# Patient Record
Sex: FEMALE | Race: WHITE | NOT HISPANIC OR LATINO
[De-identification: names, ages, dates, MRNs, and addresses within clinical notes are randomized per-mention and may not be internally consistent; named-entity substitution may affect disease eponyms.]

---
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
## 2022-02-24 ENCOUNTER — APPOINTMENT (OUTPATIENT)
Dept: PEDIATRIC ORTHOPEDIC SURGERY | Facility: CLINIC | Age: 22
End: 2022-02-24
Payer: MEDICAID

## 2022-02-24 DIAGNOSIS — M25.561 PAIN IN RIGHT KNEE: ICD-10-CM

## 2022-02-24 PROCEDURE — 99203 OFFICE O/P NEW LOW 30 MIN: CPT | Mod: 25

## 2022-02-24 PROCEDURE — 73564 X-RAY EXAM KNEE 4 OR MORE: CPT

## 2022-02-27 NOTE — REVIEW OF SYSTEMS
[Joint Pains] : arthralgias [Joint Swelling] : joint swelling  [Change in Activity] : no change in activity [Fever Above 102] : no fever [Malaise] : no malaise [Rash] : no rash [Itching] : no itching [Eye Pain] : no eye pain [Heart Problems] : no heart problems [Nasal Stuffiness] : no nasal congestion [Tachypnea] : no tachypnea [Limping] : no limping [Muscle Aches] : no muscle aches

## 2022-02-27 NOTE — END OF VISIT
[FreeTextEntry3] : I, Pantera Barcenas MD, personally saw and evaluated the patient and developed the plan as documented above. I concur or have edited the note as appropriate.\par

## 2022-02-27 NOTE — ASSESSMENT
[FreeTextEntry1] : Dee is a 21-year-old female with right knee pain following an injury on February 19, 2022.\par \par Today's visit included obtaining the history from the child and parent, due to the child's age, the child could not be considered a reliable historian, requiring the parent to act as an independent historian. The condition, natural history, and prognosis were explained to the patient and family. The clinical findings and imaging were reviewed with the family. \par -It was discussed with Dee that based on radiographs and clinical examination there is no orthopedic concern for her right knee pain\par -At this time she can continue with her knee immobilizer for comfort as needed\par -It was discussed that physical therapy to work on knee range of motion and quad strengthening could help decrease her pain, a prescription was provided for that today.\par -If she continues to have pain she should return for follow-up and at that time a possible MRI of the area will be considered.\par -She is to follow-up as needed or if her pain does not improve in the next few weeks for further clinical evaluation\par \par All questions and concerns were addressed today. Parent and patient verbalize understanding and agree with plan of care.\par \par I, Natalie Swan, have acted as a scribe and documented the above information for Dr. Barcenas \par

## 2022-02-27 NOTE — HISTORY OF PRESENT ILLNESS
[FreeTextEntry1] : Dee is an 21-year-old female with right knee pain.  She states that on Saturday, February 19 she stood up from the couch and endorsed immediate pain and popping about the right kneecap.  She states that it was immediately swollen and red but then by the following day the swelling had resolved.  She states that since that date she is been having discomfort about the knee on occasion where she feels as if there is air in her knee and when that air is keeps she has pain.  She presented to urgent care where radiographs were not obtained and it was discussed that they were unsure of what was causing her pain.  They stated that if she was still having pain she was to follow-up with orthopedics for further evaluation.  She presents today for further evaluation of the above, she states that she still having intermittent knee pain.  She states that she has been using a knee brace that she obtained from her sister and that it helps slightly with the discomfort.  She denies numbness or tingling about the lower extremity. She denies fever or chills. She denies any further injury to the area.

## 2022-02-27 NOTE — PHYSICAL EXAM
[FreeTextEntry1] : GENERAL: alert, cooperative, in NAD\par SKIN: The skin is intact, warm, pink and dry over the area examined.\par EYES: Normal conjunctiva, normal eyelids and pupils were equal and round.\par ENT: normal ears, normal nose and normal lips.\par CARDIOVASCULAR: brisk capillary refill, but no peripheral edema.\par RESPIRATORY: The patient is in no apparent respiratory distress. They're taking full deep breaths without use of accessory muscles or evidence of audible wheezes or stridor without the use of a stethoscope. Normal respiratory effort.\par ABDOMEN: not examined. \par \par Right knee\par No bony deformities, signs of trauma, or erythema noted. \par No visible effusion, muscle atrophy or asymmetry. \par There is no sign of genu varum or valgum. \par No tenderness in bony prominences or soft tissue. \par Negative J-sign\par No joint line, MCL, LCL,patellar tendon, or quadriceps tendon tenderness. \par Full active and passive range of motion of the knee.\par No joint laxity palpable. \par Joint is stable with varus and valgus stress. \par Negative Lachmann test, negative anterior and posterior drawer with solid end point. \par Negative Brad test. \par Negative patellar grind and patellar apprehension test. \par No abnormal findings on ankle or hip examination.\par Strength is 5/5. Sensation is intact to light touch distally. Brisk capillary refill in all toes.  Toes are warm, pink, and moving freely. \par No signs of antalgic gait, walks with coordination and balance\par

## 2022-02-27 NOTE — DATA REVIEWED
[de-identified] : right knee radiographs were obtained and independently reviewed during today's visit 02/24/22. There is no sign of fracture, dislocation or bony injury. The patella is stable and sitting well within the trochlear groove.

## 2022-11-22 ENCOUNTER — EMERGENCY (EMERGENCY)
Facility: HOSPITAL | Age: 22
LOS: 1 days | Discharge: HOME | End: 2022-11-22
Attending: STUDENT IN AN ORGANIZED HEALTH CARE EDUCATION/TRAINING PROGRAM | Admitting: STUDENT IN AN ORGANIZED HEALTH CARE EDUCATION/TRAINING PROGRAM
Payer: MEDICAID

## 2022-11-22 VITALS
HEART RATE: 107 BPM | SYSTOLIC BLOOD PRESSURE: 130 MMHG | RESPIRATION RATE: 18 BRPM | HEIGHT: 66 IN | WEIGHT: 194.01 LBS | TEMPERATURE: 98 F | DIASTOLIC BLOOD PRESSURE: 78 MMHG | OXYGEN SATURATION: 99 %

## 2022-11-22 LAB
APPEARANCE UR: ABNORMAL
BACTERIA # UR AUTO: ABNORMAL
BASOPHILS # BLD AUTO: 0.05 K/UL — SIGNIFICANT CHANGE UP (ref 0–0.2)
BASOPHILS NFR BLD AUTO: 0.4 % — SIGNIFICANT CHANGE UP (ref 0–2)
BILIRUB UR-MCNC: NEGATIVE — SIGNIFICANT CHANGE UP
COLOR SPEC: YELLOW — SIGNIFICANT CHANGE UP
DIFF PNL FLD: ABNORMAL
EOSINOPHIL # BLD AUTO: 0.02 K/UL — SIGNIFICANT CHANGE UP (ref 0–0.5)
EOSINOPHIL NFR BLD AUTO: 0.2 % — SIGNIFICANT CHANGE UP (ref 0–6)
EPI CELLS # UR: SIGNIFICANT CHANGE UP
GLUCOSE UR QL: NEGATIVE — SIGNIFICANT CHANGE UP
HCT VFR BLD CALC: 42.5 % — SIGNIFICANT CHANGE UP (ref 34.5–45)
HGB BLD-MCNC: 13.7 G/DL — SIGNIFICANT CHANGE UP (ref 11.5–15.5)
IMM GRANULOCYTES NFR BLD AUTO: 0.3 % — SIGNIFICANT CHANGE UP (ref 0–0.9)
KETONES UR-MCNC: ABNORMAL
LEUKOCYTE ESTERASE UR-ACNC: ABNORMAL
LYMPHOCYTES # BLD AUTO: 2.89 K/UL — SIGNIFICANT CHANGE UP (ref 1–3.3)
LYMPHOCYTES # BLD AUTO: 24.8 % — SIGNIFICANT CHANGE UP (ref 13–44)
MCHC RBC-ENTMCNC: 28 PG — SIGNIFICANT CHANGE UP (ref 27–34)
MCHC RBC-ENTMCNC: 32.2 GM/DL — SIGNIFICANT CHANGE UP (ref 32–36)
MCV RBC AUTO: 86.9 FL — SIGNIFICANT CHANGE UP (ref 80–100)
MONOCYTES # BLD AUTO: 0.83 K/UL — SIGNIFICANT CHANGE UP (ref 0–0.9)
MONOCYTES NFR BLD AUTO: 7.1 % — SIGNIFICANT CHANGE UP (ref 2–14)
NEUTROPHILS # BLD AUTO: 7.81 K/UL — HIGH (ref 1.8–7.4)
NEUTROPHILS NFR BLD AUTO: 67.2 % — SIGNIFICANT CHANGE UP (ref 43–77)
NITRITE UR-MCNC: NEGATIVE — SIGNIFICANT CHANGE UP
NRBC # BLD: 0 /100 WBCS — SIGNIFICANT CHANGE UP (ref 0–0)
OB PNL STL: POSITIVE
PH UR: 6 — SIGNIFICANT CHANGE UP (ref 5–8)
PLATELET # BLD AUTO: 311 K/UL — SIGNIFICANT CHANGE UP (ref 150–400)
PROT UR-MCNC: 15
RBC # BLD: 4.89 M/UL — SIGNIFICANT CHANGE UP (ref 3.8–5.2)
RBC # FLD: 14 % — SIGNIFICANT CHANGE UP (ref 10.3–14.5)
RBC CASTS # UR COMP ASSIST: SIGNIFICANT CHANGE UP /HPF (ref 0–4)
SP GR SPEC: 1.02 — SIGNIFICANT CHANGE UP (ref 1.01–1.02)
UROBILINOGEN FLD QL: NEGATIVE — SIGNIFICANT CHANGE UP
WBC # BLD: 11.63 K/UL — HIGH (ref 3.8–10.5)
WBC # FLD AUTO: 11.63 K/UL — HIGH (ref 3.8–10.5)
WBC UR QL: SIGNIFICANT CHANGE UP

## 2022-11-22 PROCEDURE — 99285 EMERGENCY DEPT VISIT HI MDM: CPT

## 2022-11-22 RX ORDER — PHENTERMINE HCL 30 MG
1 CAPSULE ORAL
Qty: 0 | Refills: 0 | DISCHARGE

## 2022-11-22 RX ORDER — SODIUM CHLORIDE 9 MG/ML
1000 INJECTION INTRAMUSCULAR; INTRAVENOUS; SUBCUTANEOUS ONCE
Refills: 0 | Status: COMPLETED | OUTPATIENT
Start: 2022-11-22 | End: 2022-11-22

## 2022-11-22 RX ORDER — RIZATRIPTAN BENZOATE 5 MG/1
0 TABLET ORAL
Qty: 0 | Refills: 0 | DISCHARGE

## 2022-11-22 RX ADMIN — SODIUM CHLORIDE 1000 MILLILITER(S): 9 INJECTION INTRAMUSCULAR; INTRAVENOUS; SUBCUTANEOUS at 23:03

## 2022-11-22 NOTE — ED PROVIDER NOTE - PROGRESS NOTE DETAILS
Results of work up d/w patient and copies of all reports given.  Patient and mom aware of large right ovarian cyst, patient will follow up.  Tricia rosales, NT/ND.  Will give GI follow up Results of work up d/w patient and copies of all reports given.  Patient and mom aware of large right ovarian cyst, patient will follow up.  Tricia rosales, NT/ND.  Will give GI follow up.  Patient aware that she may need a colonoscopy if symptoms persist.

## 2022-11-22 NOTE — ED PROVIDER NOTE - NSFOLLOWUPINSTRUCTIONS_ED_ALL_ED_FT
Please follow up with GI.  Return to the ER for persistent bleeding, dizziness, rectal pain, vomiting, diarrhea, fever, or any other concerns.       Gastrointestinal Bleeding      Gastrointestinal (GI) bleeding is bleeding anywhere along the digestive tract. The digestive tract carries food from your mouth until it leaves your body as waste, or poop. You may have blood in your poop or have black poop. If you vomit, there may be blood in it too.    This condition can be mild, serious, or even life-threatening. If you have a lot of bleeding, you may need to stay in the hospital.      What are the causes?    This condition may be caused by:  •Irritation and swelling of the esophagus (esophagitis). The esophagus is part of the body that moves food from your mouth to your stomach.      •Swollen veins in the butt (hemorrhoids).      •Tears in the opening of the butt. These are often caused by passing hard poop.      •Pouches that form on the colon (diverticulosis).      •Irritation and swelling of pouches that have formed in your colon (diverticulitis).      •Growths (polyps) or cancer.      •Irritation of the stomach lining (gastritis).      •Sores (ulcers) in the stomach.        What increases the risk?    You are more likely to develop this condition if:  •You have a certain type of infection in your stomach called Helicobacter pylori infection.      •You take certain medicines.      •You smoke.      •You drink alcohol.        What are the signs or symptoms?    Common symptoms of this condition include:  •Vomiting material that has bright red blood in it. It may look like coffee grounds.    •Changes in your poop. The poop:  •May have red blood in it.      •May be black, look like tar, and smell stronger than normal.      •May be red.        •Pain or cramping in the belly (abdomen).        How is this treated?    Treatment for this condition depends on the cause of the bleeding. For example:  •Your doctor may treat the bleeding during diagnosis. Common ways of diagnosing this condition is endoscopy or colonoscopy.    •Medicines can be used to:  •Help control irritation, swelling, or infection.      •Reduce acid in your stomach.      •Certain problems can be treated with:  •Creams.      •Medicines that are put in the butt (suppositories).      •Warm baths.        •Surgery is sometimes needed.       •If you lose a lot of blood, you may need a blood transfusion.      If there is a lot of bleeding, you will need to stay in the hospital. If bleeding is mild, you may be allowed to go home.      Follow these instructions at home:  Foods with fiber, including fruits, vegetables, greens, seeds, and grain.   •Take over-the-counter and prescription medicines only as told by your doctor.      •Eat foods that have a lot of fiber in them. These foods include beans, whole grains, and fresh fruits and vegetables. You can also try eating 1–3 prunes each day.      •Drink enough fluid to keep your pee (urine) pale yellow.      •Keep all follow-up visits.        Contact a doctor if:    •Your symptoms do not get better with treatment.        Get help right away if:    •Your bleeding does not stop.      •You feel dizzy or you pass out (faint).      •You feel weak.      •You have very bad cramps in your back or belly.      •You pass large clumps of blood (clots) in your poop.      •Your symptoms are getting worse.      •You have chest pain or fast heartbeats.      These symptoms may be an emergency. Get help right away. Call 911.   • Do not wait to see if the symptoms will go away.        • Do not drive yourself to the hospital.         Summary    •Gastrointestinal (GI) bleeding is bleeding anywhere along the digestive tract. The digestive tract carries food from your mouth until it leaves your body as waste, or poop.      •This bleeding can be caused by many things. Treatment depends on the cause of the bleeding.      •Take medicines only as told by your doctor.      •Keep all follow-up visits.      This information is not intended to replace advice given to you by your health care provider. Make sure you discuss any questions you have with your health care provider.

## 2022-11-22 NOTE — ED PROVIDER NOTE - GASTROINTESTINAL, MLM
Abdomen soft, non-tender, non-distended, no rebound, no guarding, no pulsatile mass.  On rectal exam, good tone.  No anal fissure or external hemorrhoids.  No palpable mass, stool grossly brown

## 2022-11-22 NOTE — ED PROVIDER NOTE - CARE PROVIDER_API CALL
Jacques Jacobo (DO)  Pediatrics  [address] [place] [address]  Phone: [phone]  Fax: [phone]  Follow Up Time:     Andrea Barone (MD)  Gastroenterology; Internal Medicine  [address], [address]  [place] [address]  Phone: [phone]  Fax: [phone]  Follow Up Time:     Ricardo Davidson ()  Gastroenterology; Internal Medicine  [address] [place] [address]  Phone: [phone]  Fax: [phone]  Follow Up Time:

## 2022-11-22 NOTE — ED ADULT TRIAGE NOTE - CHIEF COMPLAINT QUOTE
21 y/o female received ambulatory to triage. Alert and oriented x4. C/o 2 episodes of bloody stool. Pt reports starting a new medication, phentermine 37.5mg for weight loss. Denies any cp, sob, n/v/d, dizziness, headache, fever/chills.

## 2022-11-22 NOTE — ED PROVIDER NOTE - OBJECTIVE STATEMENT
22 year old female with a history of IBS, migraines presents with bloody stool x 3 episodes.  Patient reports her first episode 1 week ago, states it was scant and she felt like she passed a small blood clot in her stool.  She had no further episodes until today.  Reports generalized abdominal cramping today which is typical of her IBS.  She had 2 episodes of diarrhea mixed with copious bright red blood.  Denies n/v/rectal pain, dizziness.  No history of diverticulitis or hemorrhoids. No recent travel or use of antibiotics.  She started Phentermine for weight loss approximately 1 month ago.  PM Jacques Jacobo

## 2022-11-22 NOTE — ED PROVIDER NOTE - PATIENT PORTAL LINK FT
You can access the FollowMyHealth Patient Portal offered by [facility] by registering at the following website: [email or web address]. By joining [employer]’s FollowMyHealth portal, you will also be able to view your health information using other applications (apps) compatible with our system.

## 2022-11-22 NOTE — ED PROVIDER NOTE - CLINICAL SUMMARY MEDICAL DECISION MAKING FREE TEXT BOX
22 year old female with IBS p/w diarrhea and bloody stool x 2 episodes.  On rectal pain, n/v.  C/o mild generalized abdominal cramping.  Check labs, stool occult blood, UA, CT abd/pelvis, r/o diverticulitis, colitis, Crohn's

## 2022-11-22 NOTE — ED ADULT NURSE NOTE - OBJECTIVE STATEMENT
Pt presented to ED c/o bloody stool x2.  Pt states she has a hx of IBS but never had blood in stool before.  Denies any chest pain or SOB.  No abd pain, no n/v/d.  Pt recently started new weight loss medication, phentermine.  Maintain comfort and safety. Sski Pregnancy And Lactation Text: This medication is Pregnancy Category D and isn't considered safe during pregnancy. It is excreted in breast milk.

## 2022-11-22 NOTE — ED ADULT NURSE NOTE - CHIEF COMPLAINT QUOTE
23 y/o female received ambulatory to triage. Alert and oriented x4. C/o 2 episodes of bloody stool. Pt reports starting a new medication, phentermine 37.5mg for weight loss. Denies any cp, sob, n/v/d, dizziness, headache, fever/chills.

## 2022-11-22 NOTE — ED PROVIDER NOTE - PROVIDER TOKENS
PROVIDER:[TOKEN:[[id number]]],PROVIDER:[TOKEN:[[id number]]],PROVIDER:[TOKEN:[[id number]]]

## 2022-11-23 VITALS
RESPIRATION RATE: 16 BRPM | HEART RATE: 96 BPM | SYSTOLIC BLOOD PRESSURE: 113 MMHG | DIASTOLIC BLOOD PRESSURE: 71 MMHG | TEMPERATURE: 99 F | OXYGEN SATURATION: 99 %

## 2022-11-23 LAB
ALBUMIN SERPL ELPH-MCNC: 4.2 G/DL — SIGNIFICANT CHANGE UP (ref 3.3–5)
ALP SERPL-CCNC: 84 U/L — SIGNIFICANT CHANGE UP (ref 40–120)
ALT FLD-CCNC: 24 U/L — SIGNIFICANT CHANGE UP (ref 12–78)
ANION GAP SERPL CALC-SCNC: 6 MMOL/L — SIGNIFICANT CHANGE UP (ref 5–17)
AST SERPL-CCNC: 14 U/L — LOW (ref 15–37)
BILIRUB SERPL-MCNC: 0.3 MG/DL — SIGNIFICANT CHANGE UP (ref 0.2–1.2)
BUN SERPL-MCNC: 15 MG/DL — SIGNIFICANT CHANGE UP (ref 7–23)
CALCIUM SERPL-MCNC: 9 MG/DL — SIGNIFICANT CHANGE UP (ref 8.5–10.1)
CHLORIDE SERPL-SCNC: 108 MMOL/L — SIGNIFICANT CHANGE UP (ref 96–108)
CO2 SERPL-SCNC: 25 MMOL/L — SIGNIFICANT CHANGE UP (ref 22–31)
CREAT SERPL-MCNC: 0.83 MG/DL — SIGNIFICANT CHANGE UP (ref 0.5–1.3)
EGFR: 103 ML/MIN/1.73M2 — SIGNIFICANT CHANGE UP
GLUCOSE SERPL-MCNC: 78 MG/DL — SIGNIFICANT CHANGE UP (ref 70–99)
HCG SERPL-ACNC: <1 MIU/ML — SIGNIFICANT CHANGE UP
POTASSIUM SERPL-MCNC: 3.6 MMOL/L — SIGNIFICANT CHANGE UP (ref 3.5–5.3)
POTASSIUM SERPL-SCNC: 3.6 MMOL/L — SIGNIFICANT CHANGE UP (ref 3.5–5.3)
PROT SERPL-MCNC: 7.7 G/DL — SIGNIFICANT CHANGE UP (ref 6–8.3)
SODIUM SERPL-SCNC: 139 MMOL/L — SIGNIFICANT CHANGE UP (ref 135–145)

## 2022-11-23 PROCEDURE — 84702 CHORIONIC GONADOTROPIN TEST: CPT

## 2022-11-23 PROCEDURE — 81001 URINALYSIS AUTO W/SCOPE: CPT

## 2022-11-23 PROCEDURE — 74177 CT ABD & PELVIS W/CONTRAST: CPT | Mod: MA

## 2022-11-23 PROCEDURE — 87086 URINE CULTURE/COLONY COUNT: CPT

## 2022-11-23 PROCEDURE — 74177 CT ABD & PELVIS W/CONTRAST: CPT | Mod: 26,MA

## 2022-11-23 PROCEDURE — 80053 COMPREHEN METABOLIC PANEL: CPT

## 2022-11-23 PROCEDURE — 82272 OCCULT BLD FECES 1-3 TESTS: CPT

## 2022-11-23 PROCEDURE — 85025 COMPLETE CBC W/AUTO DIFF WBC: CPT

## 2022-11-23 PROCEDURE — 36415 COLL VENOUS BLD VENIPUNCTURE: CPT

## 2022-11-23 PROCEDURE — 99284 EMERGENCY DEPT VISIT MOD MDM: CPT | Mod: 25

## 2022-11-24 PROBLEM — G43.909 MIGRAINE, UNSPECIFIED, NOT INTRACTABLE, WITHOUT STATUS MIGRAINOSUS: Chronic | Status: ACTIVE | Noted: 2022-11-22

## 2022-11-24 PROBLEM — K58.9 IRRITABLE BOWEL SYNDROME WITHOUT DIARRHEA: Chronic | Status: ACTIVE | Noted: 2022-11-22

## 2022-11-24 LAB
CULTURE RESULTS: SIGNIFICANT CHANGE UP
SPECIMEN SOURCE: SIGNIFICANT CHANGE UP

## 2022-11-27 ENCOUNTER — EMERGENCY (EMERGENCY)
Facility: HOSPITAL | Age: 22
LOS: 1 days | End: 2022-11-27
Attending: STUDENT IN AN ORGANIZED HEALTH CARE EDUCATION/TRAINING PROGRAM
Payer: MEDICAID

## 2022-11-27 VITALS
RESPIRATION RATE: 18 BRPM | OXYGEN SATURATION: 98 % | WEIGHT: 194.01 LBS | HEIGHT: 66 IN | HEART RATE: 100 BPM | SYSTOLIC BLOOD PRESSURE: 125 MMHG | TEMPERATURE: 98 F | DIASTOLIC BLOOD PRESSURE: 71 MMHG

## 2022-11-27 LAB
BASOPHILS # BLD AUTO: 0.05 K/UL — SIGNIFICANT CHANGE UP (ref 0–0.2)
BASOPHILS NFR BLD AUTO: 0.4 % — SIGNIFICANT CHANGE UP (ref 0–2)
EOSINOPHIL # BLD AUTO: 0.09 K/UL — SIGNIFICANT CHANGE UP (ref 0–0.5)
EOSINOPHIL NFR BLD AUTO: 0.8 % — SIGNIFICANT CHANGE UP (ref 0–6)
HCT VFR BLD CALC: 38.6 % — SIGNIFICANT CHANGE UP (ref 34.5–45)
HGB BLD-MCNC: 12.4 G/DL — SIGNIFICANT CHANGE UP (ref 11.5–15.5)
IMM GRANULOCYTES NFR BLD AUTO: 0.3 % — SIGNIFICANT CHANGE UP (ref 0–0.9)
LYMPHOCYTES # BLD AUTO: 2.67 K/UL — SIGNIFICANT CHANGE UP (ref 1–3.3)
LYMPHOCYTES # BLD AUTO: 23.1 % — SIGNIFICANT CHANGE UP (ref 13–44)
MCHC RBC-ENTMCNC: 28 PG — SIGNIFICANT CHANGE UP (ref 27–34)
MCHC RBC-ENTMCNC: 32.1 GM/DL — SIGNIFICANT CHANGE UP (ref 32–36)
MCV RBC AUTO: 87.1 FL — SIGNIFICANT CHANGE UP (ref 80–100)
MONOCYTES # BLD AUTO: 0.99 K/UL — HIGH (ref 0–0.9)
MONOCYTES NFR BLD AUTO: 8.6 % — SIGNIFICANT CHANGE UP (ref 2–14)
NEUTROPHILS # BLD AUTO: 7.73 K/UL — HIGH (ref 1.8–7.4)
NEUTROPHILS NFR BLD AUTO: 66.8 % — SIGNIFICANT CHANGE UP (ref 43–77)
NRBC # BLD: 0 /100 WBCS — SIGNIFICANT CHANGE UP (ref 0–0)
PLATELET # BLD AUTO: 274 K/UL — SIGNIFICANT CHANGE UP (ref 150–400)
RBC # BLD: 4.43 M/UL — SIGNIFICANT CHANGE UP (ref 3.8–5.2)
RBC # FLD: 14.5 % — SIGNIFICANT CHANGE UP (ref 10.3–14.5)
WBC # BLD: 11.56 K/UL — HIGH (ref 3.8–10.5)
WBC # FLD AUTO: 11.56 K/UL — HIGH (ref 3.8–10.5)

## 2022-11-27 PROCEDURE — 99283 EMERGENCY DEPT VISIT LOW MDM: CPT

## 2022-11-27 PROCEDURE — 36415 COLL VENOUS BLD VENIPUNCTURE: CPT

## 2022-11-27 PROCEDURE — 99284 EMERGENCY DEPT VISIT MOD MDM: CPT

## 2022-11-27 PROCEDURE — 80053 COMPREHEN METABOLIC PANEL: CPT

## 2022-11-27 PROCEDURE — 85025 COMPLETE CBC W/AUTO DIFF WBC: CPT

## 2022-11-27 RX ORDER — SODIUM CHLORIDE 9 MG/ML
1000 INJECTION INTRAMUSCULAR; INTRAVENOUS; SUBCUTANEOUS ONCE
Refills: 0 | Status: COMPLETED | OUTPATIENT
Start: 2022-11-27 | End: 2022-11-27

## 2022-11-27 RX ADMIN — SODIUM CHLORIDE 1000 MILLILITER(S): 9 INJECTION INTRAMUSCULAR; INTRAVENOUS; SUBCUTANEOUS at 23:05

## 2022-11-27 NOTE — ED PROVIDER NOTE - CLINICAL SUMMARY MEDICAL DECISION MAKING FREE TEXT BOX
23yo F with bloody stools, no findings on CT last week, will check h/h, has GI fu next week, will need outpt work up for IBD if labs within normal limits

## 2022-11-27 NOTE — ED ADULT TRIAGE NOTE - CHIEF COMPLAINT QUOTE
Pt c/o blood in stool today and lightheadedness, was seen here few days ago with same complaints. NAD.

## 2022-11-27 NOTE — ED PROVIDER NOTE - PATIENT PORTAL LINK FT
You can access the FollowMyHealth Patient Portal offered by [facility] by registering at the following website: [email or web address]. By joining [employer]’s FollowMyHealth portal, you will also be able to view your health information using other applications (apps) compatible with our system.

## 2022-11-27 NOTE — ED ADULT NURSE NOTE - OBJECTIVE STATEMENT
pt AOx4 stating she experienced rectal bleeding tonight with clots. denies pain, nausea, vomiting and diarrhea. h/o IBS. peripheral IV inserted to L AC #20. pt tolerated well. fluids infusing as ordered. will continue to monitor. LISA, RN

## 2022-11-27 NOTE — ED ADULT NURSE NOTE - SUICIDE SCREENING DEPRESSION
Julian Muse  CARDIOVASCULAR DISEASE  [address]  [place] [address]  Phone: [phone]  Fax: [phone]  Follow Up Time: Routine   Negative

## 2022-11-27 NOTE — ED PROVIDER NOTE - PHYSICAL EXAMINATION
Gen: Well appearing in NAD  Head: NC/AT  Neck: trachea midline  cv:borderline tachy  Resp:  No distress  abd nontender   Ext: no deformities  Neuro:  A&O appears non focal  Skin:  Warm and dry as visualized  Psych:  Normal affect and mood

## 2022-11-27 NOTE — ED PROVIDER NOTE - NSFOLLOWUPINSTRUCTIONS_ED_ALL_ED_FT
Rectal Bleeding       Rectal bleeding is when blood passes out of the opening between the buttocks (anus). People with rectal bleeding may notice bright red blood in their underwear or in the toilet after having a bowel movement. They may also have blood mixed with their stool (feces), or dark red or black stools.    Rectal bleeding is usually a sign that something is wrong. Many things can cause rectal bleeding, including:  •Diverticulosis. This is a condition in which pockets or sacs project from the bowel.      •Hemorrhoids. These are blood vessels around the anus or inside the rectum that are larger than normal.      •Anal fissures. This is a tear in the anus.      •Proctitis and colitis. These are conditions in which the rectum, colon, or anus become inflamed.      •Polyps. These are growths that can be cancerous (malignant) or noncancerous (benign).      •Infections of the intestines.      •Fistulas. These are abnormal openings in the rectum and anus.      •Rectal prolapse. This is when a part of the rectum sticks out from the anus.        Follow these instructions at home:    Pay attention to any changes in your symptoms. Take these actions to help reduce bleeding and discomfort:    Medicines     •Take over-the-counter and prescription medicines only as told by your health care provider.      •Ask your health care provider about changing or stopping your regular medicines or supplements. This is especially important if you are taking blood thinners. Medicines that thin the blood can make rectal bleeding worse.        Managing constipation      Your condition may cause constipation. To prevent or treat constipation, or to help make your stools soft, you may need to:  •Drink enough fluid to keep your urine pale yellow.      •Take over-the-counter or prescription medicines.      •Eat foods that are high in fiber, such as beans, whole grains, and fresh fruits and vegetables. Ask your health care provider if you need a supplement to give you more fiber.      •Limit foods that are high in fat and processed sugars, such as fried or sweet foods.      General instructions     •Try not to strain when having a bowel movement.      •Try taking a warm bath. This may help to soothe any pain in your rectum.      •Keep all follow-up visits as told by your health care provider. This is important.        Contact a health care provider if you:    •Have pain or tenderness in your abdomen.      •Have a fever.      •Have weakness.      •Have nausea.      •Cannot have a bowel movement.        Get help right away if you have:    •New or increased rectal bleeding.      •Black or dark red stools.      •Vomit with blood or something that looks like coffee grounds.      •A fainting episode.      •Severe pain in your rectum.        Summary    •Rectal bleeding is usually a sign that something is wrong. This condition should be evaluated by a health care provider.      •Eat a diet that is high in fiber. This will help keep your stools soft, making it easier to pass stools without straining.      •Medicines that thin the blood can make rectal bleeding worse.      •Get help right away if you have new or increased rectal bleeding, black or dark red stools, blood in your vomit, an episode of fainting, or severe pain in your rectum.      This information is not intended to replace advice given to you by your health care provider. Make sure you discuss any questions you have with your health care provider.      Document Revised: 11/18/2020 Document Reviewed: 11/18/2020    [employer] Patient Education © 2022 [employer] Follow up with your GI next week  Return for new or worsening symptoms, abdominal pain, vomiting, worsening bleeding, lightheadedness, fevers, or other symptoms     Rectal Bleeding       Rectal bleeding is when blood passes out of the opening between the buttocks (anus). People with rectal bleeding may notice bright red blood in their underwear or in the toilet after having a bowel movement. They may also have blood mixed with their stool (feces), or dark red or black stools.    Rectal bleeding is usually a sign that something is wrong. Many things can cause rectal bleeding, including:  •Diverticulosis. This is a condition in which pockets or sacs project from the bowel.      •Hemorrhoids. These are blood vessels around the anus or inside the rectum that are larger than normal.      •Anal fissures. This is a tear in the anus.      •Proctitis and colitis. These are conditions in which the rectum, colon, or anus become inflamed.      •Polyps. These are growths that can be cancerous (malignant) or noncancerous (benign).      •Infections of the intestines.      •Fistulas. These are abnormal openings in the rectum and anus.      •Rectal prolapse. This is when a part of the rectum sticks out from the anus.        Follow these instructions at home:    Pay attention to any changes in your symptoms. Take these actions to help reduce bleeding and discomfort:    Medicines     •Take over-the-counter and prescription medicines only as told by your health care provider.      •Ask your health care provider about changing or stopping your regular medicines or supplements. This is especially important if you are taking blood thinners. Medicines that thin the blood can make rectal bleeding worse.        Managing constipation      Your condition may cause constipation. To prevent or treat constipation, or to help make your stools soft, you may need to:  •Drink enough fluid to keep your urine pale yellow.      •Take over-the-counter or prescription medicines.      •Eat foods that are high in fiber, such as beans, whole grains, and fresh fruits and vegetables. Ask your health care provider if you need a supplement to give you more fiber.      •Limit foods that are high in fat and processed sugars, such as fried or sweet foods.      General instructions     •Try not to strain when having a bowel movement.      •Try taking a warm bath. This may help to soothe any pain in your rectum.      •Keep all follow-up visits as told by your health care provider. This is important.        Contact a health care provider if you:    •Have pain or tenderness in your abdomen.      •Have a fever.      •Have weakness.      •Have nausea.      •Cannot have a bowel movement.        Get help right away if you have:    •New or increased rectal bleeding.      •Black or dark red stools.      •Vomit with blood or something that looks like coffee grounds.      •A fainting episode.      •Severe pain in your rectum.        Summary    •Rectal bleeding is usually a sign that something is wrong. This condition should be evaluated by a health care provider.      •Eat a diet that is high in fiber. This will help keep your stools soft, making it easier to pass stools without straining.      •Medicines that thin the blood can make rectal bleeding worse.      •Get help right away if you have new or increased rectal bleeding, black or dark red stools, blood in your vomit, an episode of fainting, or severe pain in your rectum.      This information is not intended to replace advice given to you by your health care provider. Make sure you discuss any questions you have with your health care provider.      Document Revised: 11/18/2020 Document Reviewed: 11/18/2020    [employer] Patient Education © 2022 [employer]

## 2022-11-27 NOTE — ED PROVIDER NOTE - OBJECTIVE STATEMENT
23yo F ho migraines presents with bloody stools. pt was seen here for same last week, had normal h/h and CT with no acute findings, has GI fu for dec 8. after leaving iED had several days with no BMs but today had several episodes of large volume bloody stools with clots and pt felt lightheaded earlier. she was told to return to ED if had worsening symptoms so came to ED. prior to each bm does have some abd crampoing but not currently having pain,. no fevres, no chills, no nausea, no vomiting, no a/c use. no recnt travel 23yo F ho migraines presents with bloody stools. pt was seen here for same last week, had normal h/h and CT with no acute findings, has GI fu for dec 8. after leaving iED had several days with no BMs but today had several episodes of large volume bloody stools with clots and pt felt lightheaded earlier. she was told to return to ED if had worsening symptoms so came to ED. prior to each bm does have some abd cramping but not currently having pain,. no fevres, no chills, no nausea, no vomiting, no a/c use. no recent travel

## 2022-11-28 VITALS
OXYGEN SATURATION: 98 % | HEART RATE: 89 BPM | DIASTOLIC BLOOD PRESSURE: 71 MMHG | RESPIRATION RATE: 16 BRPM | SYSTOLIC BLOOD PRESSURE: 112 MMHG

## 2022-11-28 LAB
ALBUMIN SERPL ELPH-MCNC: 4 G/DL — SIGNIFICANT CHANGE UP (ref 3.3–5)
ALP SERPL-CCNC: 82 U/L — SIGNIFICANT CHANGE UP (ref 40–120)
ALT FLD-CCNC: 25 U/L — SIGNIFICANT CHANGE UP (ref 12–78)
ANION GAP SERPL CALC-SCNC: 7 MMOL/L — SIGNIFICANT CHANGE UP (ref 5–17)
AST SERPL-CCNC: 17 U/L — SIGNIFICANT CHANGE UP (ref 15–37)
BILIRUB SERPL-MCNC: 0.2 MG/DL — SIGNIFICANT CHANGE UP (ref 0.2–1.2)
BUN SERPL-MCNC: 20 MG/DL — SIGNIFICANT CHANGE UP (ref 7–23)
CALCIUM SERPL-MCNC: 9.2 MG/DL — SIGNIFICANT CHANGE UP (ref 8.5–10.1)
CHLORIDE SERPL-SCNC: 111 MMOL/L — HIGH (ref 96–108)
CO2 SERPL-SCNC: 25 MMOL/L — SIGNIFICANT CHANGE UP (ref 22–31)
CREAT SERPL-MCNC: 0.81 MG/DL — SIGNIFICANT CHANGE UP (ref 0.5–1.3)
EGFR: 107 ML/MIN/1.73M2 — SIGNIFICANT CHANGE UP
GLUCOSE SERPL-MCNC: 78 MG/DL — SIGNIFICANT CHANGE UP (ref 70–99)
POTASSIUM SERPL-MCNC: 4.2 MMOL/L — SIGNIFICANT CHANGE UP (ref 3.5–5.3)
POTASSIUM SERPL-SCNC: 4.2 MMOL/L — SIGNIFICANT CHANGE UP (ref 3.5–5.3)
PROT SERPL-MCNC: 6.8 G/DL — SIGNIFICANT CHANGE UP (ref 6–8.3)
SODIUM SERPL-SCNC: 143 MMOL/L — SIGNIFICANT CHANGE UP (ref 135–145)

## 2022-11-29 ENCOUNTER — APPOINTMENT (OUTPATIENT)
Dept: GASTROENTEROLOGY | Facility: CLINIC | Age: 22
End: 2022-11-29

## 2022-11-29 VITALS
OXYGEN SATURATION: 99 % | SYSTOLIC BLOOD PRESSURE: 110 MMHG | HEIGHT: 66 IN | TEMPERATURE: 97.5 F | DIASTOLIC BLOOD PRESSURE: 70 MMHG | WEIGHT: 191.8 LBS | HEART RATE: 69 BPM | BODY MASS INDEX: 30.82 KG/M2

## 2022-11-29 DIAGNOSIS — Z83.79 FAMILY HISTORY OF OTHER DISEASES OF THE DIGESTIVE SYSTEM: ICD-10-CM

## 2022-11-29 DIAGNOSIS — Z78.9 OTHER SPECIFIED HEALTH STATUS: ICD-10-CM

## 2022-11-29 DIAGNOSIS — Z72.0 TOBACCO USE: ICD-10-CM

## 2022-11-29 DIAGNOSIS — R19.8 OTHER SPECIFIED SYMPTOMS AND SIGNS INVOLVING THE DIGESTIVE SYSTEM AND ABDOMEN: ICD-10-CM

## 2022-11-29 PROCEDURE — 99204 OFFICE O/P NEW MOD 45 MIN: CPT

## 2022-11-29 RX ORDER — POLYETHYLENE GLYCOL 3350, SODIUM CHLORIDE, SODIUM BICARBONATE AND POTASSIUM CHLORIDE WITH LEMON FLAVOR 420; 11.2; 5.72; 1.48 G/4L; G/4L; G/4L; G/4L
420 POWDER, FOR SOLUTION ORAL
Qty: 1 | Refills: 0 | Status: ACTIVE | COMMUNITY
Start: 2022-11-29 | End: 1900-01-01

## 2022-11-29 NOTE — HISTORY OF PRESENT ILLNESS
[FreeTextEntry1] : Bill Jacobo MD's care this pleasant 22-year-old female\par \par Basically in good health\par \par She has had rectal bleeding for 2 or 3 weeks with bowel movements\par \par She has alternating loose bowel movements and constipation\par \par She had similar symptoms about a year ago with normal blood work at that time but no further investigation\par \par There is no family history of inflammatory bowel disease, intestinal cancers or celiac disease or any other intestinal or abdominal illnesses\par \par She went to [facility] recently because of the rectal bleeding which was fairly heavy with clots and had a CAT scan that showed a 5 cm ovarian cyst for which she was told to see her gynecologist and again I told her to see the gynecologist again today\par \par Otherwise normal exam\par \par She is not weak or dizzy\par \par She is abdominal bloating but generally no abdominal pain\par \par Her appetite is good

## 2022-11-29 NOTE — CONSULT LETTER
[Dear  ___] : Dear ~DMITRY, [Consult Letter:] : I had the pleasure of evaluating your patient, [unfilled]. [Please see my note below.] : Please see my note below. [Consult Closing:] : Thank you very much for allowing me to participate in the care of this patient.  If you have any questions, please do not hesitate to contact me. [Sincerely,] : Sincerely, [FreeTextEntry3] : Rod Kaur MD\par

## 2022-11-29 NOTE — REASON FOR VISIT
[Initial Evaluation] : an initial evaluation [FreeTextEntry1] : Rectal bleeding, change in bowel movements with alternating constipation and loose bowel movements and bloating

## 2022-11-29 NOTE — ASSESSMENT
[FreeTextEntry1] : Impression\par \par Rectal bleeding\par \par Alternating constipation and loose bowel movements\par \par Abdominal bloating\par \par Incidental noted ovarian cyst on CAT scan at emergency room\par \par Suggest\par \par See gynecologist\par \par Trial of probiotic yogurt\par \par Trial of fiber supplement such as Equalactin or FiberCon\par \par Colonoscopy\par \par Nulytely\par \par Risks/benefits:\par The procedure, the risks and benefits and alternatives have been reviewed in great detail with the patient.  Risks including, but not limited to sedation such as cardiac and pulmonary compromise, the procedure itself such as bleeding requiring hospitalization, transfusion, surgery, temporary or permanent colostomy.  Perforation or puncture of the requiring hospitalization, surgery, temporary colostomy.\par It has been explained to the patient that though colonoscopy is thought to be the best screening exam for colon cancer and polyps, no screening exam can find all colon polyps or cancers.  \par The patient expresses understanding of the procedure and consents to undergoing the procedure.\par \par

## 2022-11-29 NOTE — PHYSICAL EXAM
[Alert] : alert [Normal Voice/Communication] : normal voice/communication [Healthy Appearing] : healthy appearing [No Acute Distress] : no acute distress [Sclera] : the sclera and conjunctiva were normal [Hearing Threshold Finger Rub Not Elliott] : hearing was normal [Normal Appearance] : the appearance of the neck was normal [No Neck Mass] : no neck mass was observed [No Respiratory Distress] : no respiratory distress [No Acc Muscle Use] : no accessory muscle use [Respiration, Rhythm And Depth] : normal respiratory rhythm and effort [Heart Rate And Rhythm] : heart rate was normal and rhythm regular [None] : no edema [Bowel Sounds] : normal bowel sounds [Abdomen Tenderness] : non-tender [No Masses] : no abdominal mass palpated [Abdomen Soft] : soft [] : no hepatosplenomegaly [Cervical Lymph Nodes Enlarged Posterior Bilaterally] : no posterior cervical lymphadenopathy [No CVA Tenderness] : no CVA  tenderness [No Spinal Tenderness] : no spinal tenderness [Abnormal Walk] : normal gait [Normal Color / Pigmentation] : normal skin color and pigmentation [Oriented To Time, Place, And Person] : oriented to person, place, and time [de-identified] : A chaperone was present in the examining room during all aspects of the physical examination

## 2022-11-30 ENCOUNTER — TRANSCRIPTION ENCOUNTER (OUTPATIENT)
Age: 22
End: 2022-11-30

## 2022-12-08 ENCOUNTER — APPOINTMENT (OUTPATIENT)
Dept: GASTROENTEROLOGY | Facility: CLINIC | Age: 22
End: 2022-12-08

## 2023-01-30 ENCOUNTER — NON-APPOINTMENT (OUTPATIENT)
Age: 23
End: 2023-01-30

## 2023-01-30 DIAGNOSIS — Z20.822 ENCOUNTER FOR PREPROCEDURAL LABORATORY EXAMINATION: ICD-10-CM

## 2023-01-30 DIAGNOSIS — Z01.812 ENCOUNTER FOR PREPROCEDURAL LABORATORY EXAMINATION: ICD-10-CM

## 2023-01-31 LAB — SARS-COV-2 N GENE NPH QL NAA+PROBE: NOT DETECTED

## 2023-02-02 ENCOUNTER — TRANSCRIPTION ENCOUNTER (OUTPATIENT)
Age: 23
End: 2023-02-02

## 2023-02-02 DIAGNOSIS — K62.5 HEMORRHAGE OF ANUS AND RECTUM: ICD-10-CM

## 2023-02-02 DIAGNOSIS — R14.0 ABDOMINAL DISTENSION (GASEOUS): ICD-10-CM

## 2023-02-02 RX ORDER — SODIUM SULFATE, POTASSIUM SULFATE AND MAGNESIUM SULFATE 1.6; 3.13; 17.5 G/177ML; G/177ML; G/177ML
17.5-3.13-1.6 SOLUTION ORAL
Qty: 1 | Refills: 0 | Status: ACTIVE | COMMUNITY
Start: 2023-02-02 | End: 1900-01-01

## 2023-02-02 RX ORDER — SODIUM PICOSULFATE, MAGNESIUM OXIDE, AND ANHYDROUS CITRIC ACID 10; 3.5; 12 MG/160ML; G/160ML; G/160ML
10-3.5-12 MG-GM LIQUID ORAL
Qty: 1 | Refills: 0 | Status: ACTIVE | COMMUNITY
Start: 2023-02-02 | End: 1900-01-01

## 2023-02-03 ENCOUNTER — RESULT REVIEW (OUTPATIENT)
Age: 23
End: 2023-02-03

## 2023-02-03 ENCOUNTER — APPOINTMENT (OUTPATIENT)
Dept: GASTROENTEROLOGY | Facility: HOSPITAL | Age: 23
End: 2023-02-03
Payer: MEDICAID

## 2023-02-03 ENCOUNTER — OUTPATIENT (OUTPATIENT)
Dept: OUTPATIENT SERVICES | Facility: HOSPITAL | Age: 23
LOS: 1 days | End: 2023-02-03
Payer: MEDICAID

## 2023-02-03 DIAGNOSIS — K62.5 HEMORRHAGE OF ANUS AND RECTUM: ICD-10-CM

## 2023-02-03 LAB — HCG UR QL: NEGATIVE — SIGNIFICANT CHANGE UP

## 2023-02-03 PROCEDURE — 88305 TISSUE EXAM BY PATHOLOGIST: CPT | Mod: 26

## 2023-02-03 PROCEDURE — 45380 COLONOSCOPY AND BIOPSY: CPT

## 2023-02-03 PROCEDURE — 88313 SPECIAL STAINS GROUP 2: CPT | Mod: 26

## 2023-02-03 PROCEDURE — 88313 SPECIAL STAINS GROUP 2: CPT

## 2023-02-03 PROCEDURE — 88305 TISSUE EXAM BY PATHOLOGIST: CPT

## 2023-02-03 PROCEDURE — 81025 URINE PREGNANCY TEST: CPT

## 2023-02-06 LAB — SURGICAL PATHOLOGY STUDY: SIGNIFICANT CHANGE UP

## 2023-02-08 ENCOUNTER — NON-APPOINTMENT (OUTPATIENT)
Age: 23
End: 2023-02-08

## 2023-08-23 NOTE — ED PROVIDER NOTE - DISCHARGE DATE
23-Nov-2022 Zygomaticofacial Flap Text: Given the location of the defect, shape of the defect and the proximity to free margins a zygomaticofacial flap was deemed most appropriate for repair.  Using a sterile surgical marker, the appropriate flap was drawn incorporating the defect and placing the expected incisions within the relaxed skin tension lines where possible. The area thus outlined was incised deep to adipose tissue with a #15 scalpel blade with preservation of a vascular pedicle.  The skin margins were undermined to an appropriate distance in all directions utilizing iris scissors.  The flap was then placed into the defect and anchored with interrupted buried subcutaneous sutures.

## 2024-03-18 NOTE — ED ADULT TRIAGE NOTE - NS ED NURSE BANDS TYPE
Action 1: Continue Detail Level: Generalized Continue Regimen: folic acid 1 mg tablet - Take everyday except the day taking methotrexate\\n\\nmethotrexate sodium 2.5 mg tablet QWEEK - Take 5 pills (12.5 mg) one day per week. Name band;

## 2024-07-29 ENCOUNTER — APPOINTMENT (OUTPATIENT)
Dept: INTERNAL MEDICINE | Facility: CLINIC | Age: 24
End: 2024-07-29

## 2024-08-19 ENCOUNTER — NON-APPOINTMENT (OUTPATIENT)
Age: 24
End: 2024-08-19

## 2024-10-18 NOTE — ED PROVIDER NOTE - NSTIMEPROVIDERCAREINITIATE_GEN_ER
10/18/2024  10:58 AM 10/18/2024  11:18 AM   [facility] Extended Vitals - Weight in Kg/Lb     /78 !  147/67 !    Pulse 88  85        22-Nov-2022 22:03

## 2024-11-18 ENCOUNTER — APPOINTMENT (OUTPATIENT)
Dept: INTERNAL MEDICINE | Facility: CLINIC | Age: 24
End: 2024-11-18
Payer: COMMERCIAL

## 2024-11-18 VITALS
OXYGEN SATURATION: 96 % | TEMPERATURE: 97.9 F | BODY MASS INDEX: 28.67 KG/M2 | SYSTOLIC BLOOD PRESSURE: 123 MMHG | HEART RATE: 91 BPM | WEIGHT: 178.38 LBS | HEIGHT: 66 IN | DIASTOLIC BLOOD PRESSURE: 80 MMHG

## 2024-11-18 DIAGNOSIS — Z30.019 ENCOUNTER FOR INITIAL PRESCRIPTION OF CONTRACEPTIVES, UNSPECIFIED: ICD-10-CM

## 2024-11-18 DIAGNOSIS — Z72.0 TOBACCO USE: ICD-10-CM

## 2024-11-18 DIAGNOSIS — R19.8 OTHER SPECIFIED SYMPTOMS AND SIGNS INVOLVING THE DIGESTIVE SYSTEM AND ABDOMEN: ICD-10-CM

## 2024-11-18 DIAGNOSIS — Z83.3 FAMILY HISTORY OF DIABETES MELLITUS: ICD-10-CM

## 2024-11-18 DIAGNOSIS — K64.8 OTHER HEMORRHOIDS: ICD-10-CM

## 2024-11-18 DIAGNOSIS — Z00.00 ENCOUNTER FOR GENERAL ADULT MEDICAL EXAMINATION W/OUT ABNORMAL FINDINGS: ICD-10-CM

## 2024-11-18 DIAGNOSIS — G43.009 MIGRAINE W/OUT AURA, NOT INTRACTABLE, W/OUT STATUS MIGRAINOSUS: ICD-10-CM

## 2024-11-18 DIAGNOSIS — R14.0 ABDOMINAL DISTENSION (GASEOUS): ICD-10-CM

## 2024-11-18 DIAGNOSIS — M25.561 PAIN IN RIGHT KNEE: ICD-10-CM

## 2024-11-18 DIAGNOSIS — Z87.19 PERSONAL HISTORY OF OTHER DISEASES OF THE DIGESTIVE SYSTEM: ICD-10-CM

## 2024-11-18 DIAGNOSIS — N62 HYPERTROPHY OF BREAST: ICD-10-CM

## 2024-11-18 DIAGNOSIS — K58.2 MIXED IRRITABLE BOWEL SYNDROME: ICD-10-CM

## 2024-11-18 DIAGNOSIS — E66.3 OVERWEIGHT: ICD-10-CM

## 2024-11-18 DIAGNOSIS — Z80.51 FAMILY HISTORY OF MALIGNANT NEOPLASM OF KIDNEY: ICD-10-CM

## 2024-11-18 DIAGNOSIS — Z01.812 ENCOUNTER FOR PREPROCEDURAL LABORATORY EXAMINATION: ICD-10-CM

## 2024-11-18 DIAGNOSIS — Z11.52 ENCOUNTER FOR PREPROCEDURAL LABORATORY EXAMINATION: ICD-10-CM

## 2024-11-18 DIAGNOSIS — Z82.49 FAMILY HISTORY OF ISCHEMIC HEART DISEASE AND OTHER DISEASES OF THE CIRCULATORY SYSTEM: ICD-10-CM

## 2024-11-18 LAB
25(OH)D3 SERPL-MCNC: 21.6 NG/ML
ALBUMIN SERPL ELPH-MCNC: 4.5 G/DL
ALP BLD-CCNC: 59 U/L
ALT SERPL-CCNC: 37 U/L
ANION GAP SERPL CALC-SCNC: 11 MMOL/L
AST SERPL-CCNC: 24 U/L
BASOPHILS # BLD AUTO: 0.06 K/UL
BASOPHILS NFR BLD AUTO: 0.8 %
BILIRUB SERPL-MCNC: 0.3 MG/DL
BUN SERPL-MCNC: 16 MG/DL
CALCIUM SERPL-MCNC: 9.4 MG/DL
CHLORIDE SERPL-SCNC: 104 MMOL/L
CHOLEST SERPL-MCNC: 167 MG/DL
CO2 SERPL-SCNC: 27 MMOL/L
CREAT SERPL-MCNC: 0.74 MG/DL
EGFR: 117 ML/MIN/1.73M2
EOSINOPHIL # BLD AUTO: 0.17 K/UL
EOSINOPHIL NFR BLD AUTO: 2.3 %
ESTIMATED AVERAGE GLUCOSE: 105 MG/DL
GLUCOSE SERPL-MCNC: 90 MG/DL
HBA1C MFR BLD HPLC: 5.3 %
HCT VFR BLD CALC: 41.8 %
HDLC SERPL-MCNC: 58 MG/DL
HGB BLD-MCNC: 13 G/DL
IMM GRANULOCYTES NFR BLD AUTO: 0.4 %
IRON SATN MFR SERPL: 37 %
IRON SERPL-MCNC: 127 UG/DL
LDLC SERPL CALC-MCNC: 92 MG/DL
LYMPHOCYTES # BLD AUTO: 2.4 K/UL
LYMPHOCYTES NFR BLD AUTO: 32.4 %
MAN DIFF?: NORMAL
MCHC RBC-ENTMCNC: 29.4 PG
MCHC RBC-ENTMCNC: 31.1 G/DL
MCV RBC AUTO: 94.6 FL
MONOCYTES # BLD AUTO: 0.67 K/UL
MONOCYTES NFR BLD AUTO: 9 %
NEUTROPHILS # BLD AUTO: 4.08 K/UL
NEUTROPHILS NFR BLD AUTO: 55.1 %
NONHDLC SERPL-MCNC: 108 MG/DL
PLATELET # BLD AUTO: 248 K/UL
POTASSIUM SERPL-SCNC: 4.5 MMOL/L
PROT SERPL-MCNC: 6.7 G/DL
RBC # BLD: 4.42 M/UL
RBC # FLD: 13.7 %
SODIUM SERPL-SCNC: 142 MMOL/L
TIBC SERPL-MCNC: 345 UG/DL
TRIGL SERPL-MCNC: 89 MG/DL
TSH SERPL-ACNC: 0.57 UIU/ML
UIBC SERPL-MCNC: 218 UG/DL
WBC # FLD AUTO: 7.41 K/UL

## 2024-11-18 PROCEDURE — 99406 BEHAV CHNG SMOKING 3-10 MIN: CPT

## 2024-11-18 PROCEDURE — 36415 COLL VENOUS BLD VENIPUNCTURE: CPT

## 2024-11-18 PROCEDURE — 99385 PREV VISIT NEW AGE 18-39: CPT

## 2024-11-18 RX ORDER — LEVONORGESTREL 52 MG/1
20 INTRAUTERINE DEVICE INTRAUTERINE
Refills: 0 | Status: ACTIVE | COMMUNITY

## 2024-11-18 RX ORDER — RIZATRIPTAN BENZOATE 10 MG/1
TABLET ORAL
Refills: 0 | Status: ACTIVE | COMMUNITY

## 2024-11-19 LAB
APPEARANCE: CLEAR
BACTERIA: NEGATIVE /HPF
BILIRUBIN URINE: NEGATIVE
BLOOD URINE: NEGATIVE
CAST: 0 /LPF
COLOR: YELLOW
EPITHELIAL CELLS: 1 /HPF
GLUCOSE QUALITATIVE U: NEGATIVE MG/DL
KETONES URINE: NEGATIVE MG/DL
LEUKOCYTE ESTERASE URINE: NEGATIVE
MICROSCOPIC-UA: NORMAL
NITRITE URINE: NEGATIVE
PH URINE: 6.5
PROTEIN URINE: NEGATIVE MG/DL
RED BLOOD CELLS URINE: 2 /HPF
SPECIFIC GRAVITY URINE: 1.02
UROBILINOGEN URINE: 0.2 MG/DL
WHITE BLOOD CELLS URINE: 0 /HPF

## 2025-02-10 ENCOUNTER — APPOINTMENT (OUTPATIENT)
Dept: ORTHOPEDIC SURGERY | Facility: CLINIC | Age: 25
End: 2025-02-10
Payer: COMMERCIAL

## 2025-02-10 VITALS — HEIGHT: 66 IN | WEIGHT: 178 LBS | BODY MASS INDEX: 28.61 KG/M2

## 2025-02-10 DIAGNOSIS — M43.16 SPONDYLOLISTHESIS, LUMBAR REGION: ICD-10-CM

## 2025-02-10 DIAGNOSIS — M54.16 RADICULOPATHY, LUMBAR REGION: ICD-10-CM

## 2025-02-10 DIAGNOSIS — S33.5XXA SPRAIN OF LIGAMENTS OF LUMBAR SPINE, INITIAL ENCOUNTER: ICD-10-CM

## 2025-02-10 DIAGNOSIS — M62.830 MUSCLE SPASM OF BACK: ICD-10-CM

## 2025-02-10 PROCEDURE — 72100 X-RAY EXAM L-S SPINE 2/3 VWS: CPT

## 2025-02-10 PROCEDURE — 99203 OFFICE O/P NEW LOW 30 MIN: CPT

## 2025-02-10 PROCEDURE — 72170 X-RAY EXAM OF PELVIS: CPT

## 2025-02-10 RX ORDER — METHYLPREDNISOLONE 4 MG/1
4 TABLET ORAL
Qty: 1 | Refills: 0 | Status: ACTIVE | COMMUNITY
Start: 2025-02-10 | End: 1900-01-01

## 2025-02-10 RX ORDER — METHOCARBAMOL 750 MG/1
750 TABLET, FILM COATED ORAL
Qty: 60 | Refills: 0 | Status: ACTIVE | COMMUNITY
Start: 2025-02-10 | End: 1900-01-01

## 2025-02-10 RX ORDER — DICLOFENAC SODIUM 75 MG/1
75 TABLET, DELAYED RELEASE ORAL
Qty: 60 | Refills: 0 | Status: ACTIVE | COMMUNITY
Start: 2025-02-10 | End: 1900-01-01

## 2025-03-10 ENCOUNTER — RX RENEWAL (OUTPATIENT)
Age: 25
End: 2025-03-10

## 2025-04-04 ENCOUNTER — APPOINTMENT (OUTPATIENT)
Dept: ORTHOPEDIC SURGERY | Facility: CLINIC | Age: 25
End: 2025-04-04
Payer: COMMERCIAL

## 2025-04-04 DIAGNOSIS — M43.16 SPONDYLOLISTHESIS, LUMBAR REGION: ICD-10-CM

## 2025-04-04 DIAGNOSIS — M54.16 RADICULOPATHY, LUMBAR REGION: ICD-10-CM

## 2025-04-04 PROCEDURE — 99213 OFFICE O/P EST LOW 20 MIN: CPT

## 2025-04-18 ENCOUNTER — APPOINTMENT (OUTPATIENT)
Dept: MRI IMAGING | Facility: CLINIC | Age: 25
End: 2025-04-18

## 2025-04-18 PROCEDURE — 72148 MRI LUMBAR SPINE W/O DYE: CPT

## 2025-04-28 ENCOUNTER — APPOINTMENT (OUTPATIENT)
Dept: ORTHOPEDIC SURGERY | Facility: CLINIC | Age: 25
End: 2025-04-28
Payer: COMMERCIAL

## 2025-04-28 DIAGNOSIS — M51.26 OTHER INTERVERTEBRAL DISC DISPLACEMENT, LUMBAR REGION: ICD-10-CM

## 2025-04-28 DIAGNOSIS — M54.16 RADICULOPATHY, LUMBAR REGION: ICD-10-CM

## 2025-04-28 DIAGNOSIS — M48.061 SPINAL STENOSIS, LUMBAR REGION WITHOUT NEUROGENIC CLAUDICATION: ICD-10-CM

## 2025-04-28 PROCEDURE — 99213 OFFICE O/P EST LOW 20 MIN: CPT

## 2025-05-05 ENCOUNTER — RX RENEWAL (OUTPATIENT)
Age: 25
End: 2025-05-05

## 2025-05-30 ENCOUNTER — NON-APPOINTMENT (OUTPATIENT)
Age: 25
End: 2025-05-30

## 2025-05-31 ENCOUNTER — APPOINTMENT (OUTPATIENT)
Dept: PAIN MANAGEMENT | Facility: CLINIC | Age: 25
End: 2025-05-31
Payer: COMMERCIAL

## 2025-05-31 VITALS — HEIGHT: 67 IN | WEIGHT: 176 LBS | BODY MASS INDEX: 27.62 KG/M2

## 2025-05-31 DIAGNOSIS — M48.061 SPINAL STENOSIS, LUMBAR REGION WITHOUT NEUROGENIC CLAUDICATION: ICD-10-CM

## 2025-05-31 DIAGNOSIS — M51.26 OTHER INTERVERTEBRAL DISC DISPLACEMENT, LUMBAR REGION: ICD-10-CM

## 2025-05-31 DIAGNOSIS — M54.16 RADICULOPATHY, LUMBAR REGION: ICD-10-CM

## 2025-05-31 PROCEDURE — 99204 OFFICE O/P NEW MOD 45 MIN: CPT

## 2025-06-18 ENCOUNTER — RX RENEWAL (OUTPATIENT)
Age: 25
End: 2025-06-18

## 2025-07-18 ENCOUNTER — RX RENEWAL (OUTPATIENT)
Age: 25
End: 2025-07-18

## 2025-08-13 ENCOUNTER — APPOINTMENT (OUTPATIENT)
Dept: PAIN MANAGEMENT | Facility: CLINIC | Age: 25
End: 2025-08-13

## 2025-09-18 ENCOUNTER — APPOINTMENT (OUTPATIENT)
Dept: PAIN MANAGEMENT | Facility: CLINIC | Age: 25
End: 2025-09-18

## (undated) DEVICE — TUBING ENDO EXT OLYMPUS 160 24HR USE

## (undated) DEVICE — TUBING SUCTION CONN 6FT STERILE

## (undated) DEVICE — PACK IV START WITH CHG

## (undated) DEVICE — FORCEP RADIAL JAW 4 W NDL 2.4MM 2.8MM 240CM ORANGE DISP

## (undated) DEVICE — ELCTR GROUNDING PAD ADULT COVIDIEN

## (undated) DEVICE — STERIS DEFENDO 3-PIECE KIT (AIR/WATER, SUCTION & BIOPSY VALVES)

## (undated) DEVICE — FORCEP RADIAL JAW 4 JUMBO 2.8MM 3.2MM 240CM ORANGE DISP

## (undated) DEVICE — SUCTION YANKAUER TAPERED BULBOUS NO VENT

## (undated) DEVICE — ENDOCUFF VISION SZ 3 SM PRPL

## (undated) DEVICE — BRUSH COLONOSCOPY CYTOLOGY

## (undated) DEVICE — NDL INJ SCLERO INTERJECT 23G

## (undated) DEVICE — TUBE RECTAL 24FR

## (undated) DEVICE — ENDOCUFF VISION SZ 2 LG GRN

## (undated) DEVICE — CATH IV SAFE BC 20G X 1.16" (PINK)

## (undated) DEVICE — SOL IRR POUR H2O 500ML

## (undated) DEVICE — SYR LUER SLIP TIP 30CC

## (undated) DEVICE — MASK OXYGEN PANORAMIC

## (undated) DEVICE — VALVE BIOPSY

## (undated) DEVICE — SNARE POLYP SENS 27MM 240CM

## (undated) DEVICE — TRAP SPECIMEN SPUTUM 40CC

## (undated) DEVICE — CANISTER SUCTION 1200CC 10/SL

## (undated) DEVICE — FORMALIN CUPS 10% BUFFERED

## (undated) DEVICE — CLAMP BX HOT RAD JAW 3

## (undated) DEVICE — CATH ELCTR GLIDE PRB 7FR

## (undated) DEVICE — Device

## (undated) DEVICE — SENSOR O2 FINGER XL ADULT 24/BX 6BX/CA

## (undated) DEVICE — CATH ELECHMSTAT  INJ 7FR 210CM

## (undated) DEVICE — POLY TRAP ETRAP

## (undated) DEVICE — TUBING CANNULA SALTER LABS NASAL ADULT 7FT

## (undated) DEVICE — SNARE LRG

## (undated) DEVICE — SET IV PUMP BLOOD 1VALVE 180FILTER NON-DEHP

## (undated) DEVICE — RETRIEVER ROTH NET PLATINUM-UNIVERSAL

## (undated) DEVICE — TUBING IV SET SECONDARY 34"

## (undated) DEVICE — SYR IV POSIFLUSH NS 3ML 30/TY

## (undated) DEVICE — MASK O2 NON REBREATH 3IN1 ADULT

## (undated) DEVICE — SOL IRR NS 0.9% 250ML

## (undated) DEVICE — TUBING IV SET GRAVITY 3Y 100" MACRO

## (undated) DEVICE — CATH IV SAFE BC 22G X 1" (BLUE)

## (undated) DEVICE — SYR LUER SLIP TIP 50CC

## (undated) DEVICE — TRAP QUICK CATCH  SINGL CHAMBER

## (undated) DEVICE — SUT HEWSON RETRIEVER

## (undated) DEVICE — TUBE O2 SUPL CRUSH RESIS CONN SOUTHSIDE ONLY

## (undated) DEVICE — MARKER ENDO SPOT EX